# Patient Record
(demographics unavailable — no encounter records)

---

## 2025-03-21 NOTE — DATA REVIEWED
[FreeTextEntry1] : Audiogram Indications  An audiogram was ordered to evaluate for eustachian tube dysfunction and/or conductive or sensorineural hearing loss given current symptoms/findings. Findings: Tymps: Type C/As Audio: -4k on the right very mild CHL at 4k on the left.  The above results were independently interpreted by me and discussed with the family.

## 2025-03-21 NOTE — ASSESSMENT
[FreeTextEntry1] : 4 year male with multiple issues.  Croup and occ cough - now resolved  Speech and hearing - Wax cleaned today.  Audio done with CHL but current OME with recent URI. Cont SLP therapy  Snoring/ATH - Mild to moderate ATH.  Discussed with the parent regarding sleep observation by going into their kids room a few times a week and watch them sleep for 5-10 min at varying times of the night to monitor snoring, apneas or gasping, signs of struggling to breath, restlessness, or other signs of SDB.  Sometimes we consider ordering a sleep study if highly concerned. Can discuss findings at next appointment.    RTC 3 months with audio.

## 2025-03-21 NOTE — HISTORY OF PRESENT ILLNESS
[de-identified] : 3-21-25 Returns today for hearing test - didnt cooperate last visit Continues to get large amount of earwax In speech therapy with improvement, continues to have articulation issues  No recent fevers or infections   8--23-24 3 year male with concerns for speech and wax issues. mostly articulation tissue. no hx of speech delay did not qualify for EI but now in private speech and now in school therapy. /c/ /s/ /g/  never had hearing test besides [facility] passed NB 2 AOM this year. none last year. birth hx non contrib, WNL no fmhx of HL.   Seen by pulm for recurrent croup/RAD.  oral steroids X 4-5 since birth.  mostly with URIs.  had CXR that showed reactive findings noisy breathing not when not sick, cough at times.  no FB history.  did have a cookie episode 2 years ago but coughed it up mouthbreathing at night. occ snoring at night. no apneas or pauses.  never had sleep study.  no hx of strep

## 2025-03-21 NOTE — PHYSICAL EXAM
[Complete] : complete cerumen impaction [2+] : 2+ [Normal Gait and Station] : normal gait and station [Normal muscle strength, symmetry and tone of facial, head and neck musculature] : normal muscle strength, symmetry and tone of facial, head and neck musculature [Normal] : no cervical lymphadenopathy [Exposed Vessel] : left anterior vessel not exposed [Wheezing] : no wheezing [Increased Work of Breathing] : no increased work of breathing with use of accessory muscles and retractions [de-identified] : OME [de-identified] : OME